# Patient Record
Sex: MALE | Race: WHITE | Employment: OTHER | URBAN - METROPOLITAN AREA
[De-identification: names, ages, dates, MRNs, and addresses within clinical notes are randomized per-mention and may not be internally consistent; named-entity substitution may affect disease eponyms.]

---

## 2018-05-03 ENCOUNTER — HOSPITAL ENCOUNTER (OUTPATIENT)
Dept: PHYSICAL THERAPY | Age: 83
Discharge: HOME | End: 2018-05-03
Payer: MEDICARE

## 2018-05-03 PROCEDURE — G8978 MOBILITY CURRENT STATUS: HCPCS

## 2018-05-03 PROCEDURE — G8979 MOBILITY GOAL STATUS: HCPCS

## 2018-05-03 PROCEDURE — 97112 NEUROMUSCULAR REEDUCATION: CPT

## 2018-05-03 PROCEDURE — 97162 PT EVAL MOD COMPLEX 30 MIN: CPT

## 2018-05-03 NOTE — PROGRESS NOTES
[facility] AT [address], [place] [address] [facility], [place] [address] - Phone: [phone]  Fax: [phone] / 1731 [facility]  Patient Name: Claude Bay : 3/10/1930   Medical   Diagnosis: Dizziness and giddiness [R42] Treatment Diagnosis: Dizziness and giddiness [R42]   Onset Date: 2018     Referral Source: ELLI Crane Start of [facility]): 5/3/2018   Prior Hospitalization: See medical history Provider #: [id number]   Prior Level of Function: Independent ambulation without dizziness    Comorbidities: Atrial Fibrillation, Pacemaker, Deep Brain Stimulator (essential tremor), Hx of Cataracts, Hx of Prostate Cancer   Medications: Verified on Patient Summary List   The Plan of Care and following information is based on the information from the initial evaluation.    ===========================================================================================  Assessment / key information:  Patient is 80 y.o. yo male who presents to [facility] with diagnosis of Dizziness and giddiness [R42]. Patient reports rapid onset of dizziness and imbalance began 2 weeks ago with reaching to pay a parking meeter. Notes episode of nausea, vomiting, dizziness, right eye movements, imbalance and difficulty ambulating. Notes 1 fall forward while walking with a walker while attempting to open a door. Upon objective evaluation, patient demonstrates impaired use of vestibular input input and hip/ankle balance strategies. Patient scored 10/24 on DGI indicating increased risk of fall with ambulation. Debo Pérez was cleared and negative. Patient scored 46 on FOTO indcating decreased quality of life.   Patient can benefit from PT interventions to decrease r/o fall, improve safety with ADLs, & decrease sx with ADLs & to improve overall functional status.  ==================================================================================  Eval Complexity: History: HIGH Complexity :3+ comorbidities / personal factors will impact the outcome/ POC Exam:HIGH Complexity : 4+ Standardized tests and measures addressing body structure, function, activity limitation and / or participation in recreation  Presentation: MEDIUM Complexity : Evolving with changing characteristics  Clinical Decision Making:Other outcome measures DGI 10/24  MEDIUMOverall Complexity:MEDIUM  Problem List: decrease strength, impaired gait/ balance, decrease ADL/ functional abilitiies, decrease activity tolerance, decrease flexibility/ joint mobility and decrease transfer abilities  Treatment Plan may include any combination of the following: Therapeutic exercise, Therapeutic activities, Neuromuscular re-education, Physical agent/modality, Gait/balance training, Manual therapy and Patient education  Patient / Family readiness to learn indicated by: asking questions, trying to perform skills and interest  Persons(s) to be included in education: patient (P)  Barriers to Learning/Limitations: yes;  sensory deficits-vision/hearing/speech  Measures taken: Larger Font   Patient Goal (s): \"Stay upright\"   Patient self reported health status: good  Rehabilitation Potential: good   Short Term Goals: To be accomplished in  4  Weeks:  1) Patient performing daily HEP and educated in fall prevention techniques. 2) Patient will be able to maintain MSR heel to bunion for 30 seconds eyes open to increase safety with standing in narrow spaces. 3) Patient will be able to maintain MSR heel to arch for 30 seconds eyes closed to increase safety with showering. 4) Patient to improve score on DGI to 13/24 indicating decreased fall risk with community ambulation.  Long Term Goals:  To be accomplished in  8  Weeks:  1) Patient to be independent with HEP and instructed in vestibular taper to ensure safety and decreased risk of falls following discharge. 2) Patient to improve score on FOTO to 73 indicating improved quality of life. 3) Patient to improve score on DHI to 12/100 indicating decreased dizziness sx with functional mobility and improved QOL. 4) Patient to improve score on DGI to 16/24 indicating decreased fall risk with ambulation. 5) Patient will be able to maintain MSR heel to GT for 30 seconds eyes open to increase safety with standing in narrow spaces. 6) Patient will be able to maintain MSR heel to bunion for 30 seconds eyes closed to increase safety with showering. 7) Patient will be able to maintain Rail stance for 20 seconds eyes closed to increase safety with showering. Frequency / Duration:   Patient to be seen  1-2  times per week for 6-8  weeks:  Patient / Caregiver education and instruction: self care, activity modification and exercises  G-Codes (GP): Mobility [id number] Current  CK= 40-59%   Goal  CJ= 20-39%. The severity rating is based on the Other DGI      Therapist Signature: Gerhardt Buba Date: 2/7/0847   Certification Period: 5/3/2018 to 8/2/2018 Time: 8:13 AM   ===========================================================================================  I certify that the above Physical Therapy Services are being furnished while the patient is under my care. I agree with the treatment plan and certify that this therapy is necessary. Physician Signature:        Date:       Time:     Please sign and return to [facility] or you may fax the signed copy to [phone]. Thank you.

## 2018-05-03 NOTE — PROGRESS NOTES
PHYSICAL THERAPY - DAILY TREATMENT NOTE    Patient Name: Drexel Bamberger        Date: 5/3/2018  : 3/10/1930   YES Patient  Verified  Visit #:   1   of   8  Insurance: Payor: Gricel Art / Plan: [place] MED[place] PART [place] & B / Product Type: Medicare /      In time: 9:34 am Out time: 10:34am   Total Treatment Time: 60     Medicare Time Tracking (below)   Total Timed Codes (min):  12 1:1 Treatment Time:  12     TREATMENT AREA =  Dizziness and giddiness [R42]  SUBJECTIVE  Pain Level (on 0 to 10 scale):  0  / 10   Medication Changes/New allergies or changes in medical history, any new surgeries or procedures? NO    If yes, update Summary List   Subjective Functional Status/Changes:  []  No changes reported     See POC         OBJECTIVE  Modalities Rationale:   N/a    12 min Neuro Re-education: Education in vestibular rehabilitation program, anatomy and physiology, VSE in sitting and purpose of vestibular adaptation exercise   Rationale:         min Patient Education:  YES  Reviewed HEP   []  Progressed/Changed HEP based on: Other Objective/Functional Measures:  Physical Therapy Evaluation - Vestibular    Posture:  [] WNL      [x] Forward head    [x] Protracted shoulders    [] Retracted shoulders  [] Kyphosis:  [x] increased   [] decreased   [] Lordosis:   [] increased   [x] decreased    C/S ROM: [] WFL    [x] Limited    Describe:   C/S flexion 85%, Ext 75%, R/L SB 50%/50%, R/L Rot 25%/25%    Strength: Assess NV    Optional Tests:  Sensation:  [x] IntactDescribe: KYLAH LE light touch sensation WNLs    Coordination Testing:       Disdiadochokinesia [x] WFL    [] Impaired    Describe:       Heel - Shin  [x] [facility]    [] Impaired    Describe: Toe Tap   [x] [facility]    [] Impaired    Describe:    Oculomotor Tests: (Fixation Not Blocked)       Ocular ROM:   [x] WFL    [] Limited    Describe:       Spontaneous Nystag. [x] Neg     [] Pos    [] Left    [] Right       Gaze Holding Nystag.  [x] Neg     [] Pos    [] Left    [] Right        Smooth Pursuit  [x] Neg     [] Pos    [] Left    [] Right        Saccades   [] Neg     [x] Pos    [x] Left    [x] Right        VOR - Slow Head Mvmt [x] Neg     [] Pos    [] Left    [] Right        VOR - Fast Head Mvmt [] Neg     [x] Pos    [] Left    [x] Right        Head Thrust  [x] Neg     [] Pos    [] Left    [] Right     Other Special Tests:       Hallpike-Claudine Maneuver [x] Neg     [] Pos    [x] Left    [x] Right       Dynamic Gait Index [] Neg     [x] Pos    Score: 10/24     Balance Standard Testing (Eyes Open/Eyes Closed - EO/EC)       Romberg   [x] WFL    [] Pos    Describe: 30/30          Stand on Foam  [x] WFL    [] Pos    Describe: 30/30 wide        Standing on Rail  [] WFL    [x] Pos    Describe: 30/2        Sharpened Romberg [] WFL    [x] Pos    Describe: R2/L3        Single Leg Stand  [] WFL    [x] Pos    Describe: 1/1     Motion Sensitivity:  [x] Neg     [] Pos    Describe:     Other Tests:Justification for Eval Complexity:   Patient History: HIGH Complexity :3+ comorbidities / personal factors will impact the outcome/ POC  (Atrial Fibrillation, Pacemaker, Deep Brain Stimulator (essential tremor), Hx of Cataracts, Hx of Prostate Cancer)  Examination:HIGH Complexity : 4+ Standardized tests and measures addressing body structure, function, activity limitation and / or participation in recreation  (See POC and musculoskeletal examination attached,(-) Manus Baba)  Clinical Presentation: MEDIUM Complexity : Evolving with changing characteristics  (Hx of Falls, intermittent dizziness)  Clinical Decision Making:MEDIUM Complexity : FOTO score of 26-74 (Foto 51/100) (DHI 22/100)  Overall Complexity:MEDIUM     Post Treatment Pain Level (on 0 to 10) scale:   0  / 10     ASSESSMENT  Assessment/Changes in Function:     See POC   []  See Progress Note/Recertification   Patient will continue to benefit from skilled PT services to modify and progress therapeutic interventions, address functional mobility deficits, address ROM deficits, address strength deficits, analyze and address soft tissue restrictions, analyze and cue movement patterns, analyze and modify body mechanics/ergonomics and assess and modify postural abnormalities to attain remaining goals.    Progress toward goals / Updated goals:    See POC     PLAN  [x]  Upgrade activities as tolerated YES Continue plan of care   []  Discharge due to :    []  Other:      Therapist: Trinity Mckeon DPT     Date: 5/3/2018 Time: 8:12 AM     Future Appointments  Date Time Provider Ximena Dalal   5/10/2018 1:30 PM [facility] PT [facility] [address] [place] [address]   5/15/2018 3:30 PM Amilcar Mcgovern, PT [facility]   5/18/2018 11:00 AM [place] [address] [place] [address]   5/24/2018 9:30 AM [facility]   5/30/2018 11:30 AM [facility]   6/6/2018 11:00 AM [facility]   6/13/2018 10:00 AM [facility]

## 2018-05-10 ENCOUNTER — HOSPITAL ENCOUNTER (OUTPATIENT)
Dept: PHYSICAL THERAPY | Age: 83
Discharge: HOME | End: 2018-05-10
Payer: MEDICARE

## 2018-05-10 PROCEDURE — 97112 NEUROMUSCULAR REEDUCATION: CPT

## 2018-05-10 PROCEDURE — 97110 THERAPEUTIC EXERCISES: CPT

## 2018-05-10 NOTE — PROGRESS NOTES
PT DAILY TREATMENT NOTE     Patient Name: Victoria Lindquist  Date:5/10/2018  : 3/10/1930  [x]  Patient  Verified  Payor: [place] MEDICARE / Plan: [place] MEDICARE PART A & B / Product Type: Medicare /    In time:130  Out time:220  Total Treatment Time (min): 50  Total Timed Codes (min): 50  1:1 Treatment Time MC (min): 50   Visit #: 2 of 8-12    Treatment Area: Dizziness and giddiness [R42]    SUBJECTIVE  Pain Level (0-10 scale): 0/10  Any medication changes, allergies to medications, adverse drug reactions, diagnosis change, or new procedure performed?: [x] No    [] Yes (see summary sheet for update)  Subjective functional status/changes:   [] No changes reported  Pt without new c/o LOB and said he is doing his HEP daily as instructed. OBJECTIVE  Modality rationale:  The below therapeutic interventions were performed and/or provided to:    Min Type Additional Details    [] Estim: []Att   []Unatt        []TENS instruct                  []IFC  []Premod   []NMES                     []Other:  []w/US   []w/ice   []w/heat  Position:  Location:    []  Traction: [] Cervical       []Lumbar                       [] Prone          []Supine                       []Intermittent   []Continuous Lbs:  [] before manual  [] after manual    []  Ultrasound: []Continuous   [] Pulsed                           []1MHz   []3MHz Location:  W/cm2:    []  Iontophoresis with dexamethasone         Location: [] Take home patch   [] In clinic    []  Ice     []  heat  []  Ice massage Position:  Location:    []  Vasopneumatic Device Pressure:       [] lo [] med [] hi   Temperature: [] lo [] med [] hi   [] Skin assessment post-treatment:  []intact []redness- no adverse reaction       []redness  adverse reaction:       15 min Therapeutic Exercise:  [x] See flow sheet :   Rationale: increase strength     min Therapeutic Activity:  []  See flow sheet :   Rationale:      35 min Neuromuscular Re-education:  [x]  See flow sheet : ankle/hip strategies using carpet, foam, rail in Romberg, semitandem, and SLS with EO/EC with and without UE assist; steeping strategies with clock game   Rationale: improve balance and increase proprioception        min Manual Therapy:     Rationale:       min Gait Training:  ___ feet with ___ device on level surfaces with ___ level of assist   Rationale:           min Patient Education: [x] Review HEP    [] Progressed/Changed HEP based on:   [] positioning   [] body mechanics   [] transfers   [] heat/ice application          Other Objective/Functional Measures:   Pt with LOB on several occasions with hip strategies-LOB to the right with CGA to correct  Pt with LOB backwards with ankle strategies    Pain Level (0-10 scale) post treatment: 0/10    ASSESSMENT/Changes in Function:   Pt required CGA throughout program and with dynamic standing exercises and balance therex secondary to decreased weightshifting onto RLE. Manual approximation through pelvis to increase weightshifting through pelvis. Patient will continue to benefit from skilled PT services to modify and progress therapeutic interventions to attain remaining goals. [x]  See Plan of Care  []  See progress note/recertification  []  See Discharge Summary         Progress towards goals / Updated goals: · Short Term Goals: To be accomplished in  4  Weeks:  1) Patient performing daily HEP and educated in fall prevention techniques. -INITIATED 5/10  2) Patient will be able to maintain MSR heel to bunion for 30 seconds eyes open to increase safety with standing in narrow spaces. 3) Patient will be able to maintain MSR heel to arch for 30 seconds eyes closed to increase safety with showering. 4) Patient to improve score on DGI to 13/24 indicating decreased fall risk with community ambulation.       · Long Term Goals:  To be accomplished in  8  Weeks:  1) Patient to be independent with HEP and instructed in vestibular taper to ensure safety and decreased risk of falls following discharge. 2) Patient to improve score on FOTO to 73 indicating improved quality of life. 3) Patient to improve score on DHI to 12/100 indicating decreased dizziness sx with functional mobility and improved QOL. 4) Patient to improve score on DGI to 16/24 indicating decreased fall risk with ambulation. 5) Patient will be able to maintain MSR heel to GT for 30 seconds eyes open to increase safety with standing in narrow spaces. 6) Patient will be able to maintain MSR heel to bunion for 30 seconds eyes closed to increase safety with showering.     7) Patient will be able to maintain Rail stance for 20 seconds eyes closed to increase safety with showering.           PLAN  []  Upgrade activities as tolerated     [x]  Continue plan of care  []  Update interventions per flow sheet       []  Discharge due to:_  []  Other:_      Karolina Escalante, PT 5/10/2018  2:24 PM

## 2018-05-15 ENCOUNTER — HOSPITAL ENCOUNTER (OUTPATIENT)
Dept: PHYSICAL THERAPY | Age: 83
Discharge: HOME | End: 2018-05-15
Payer: MEDICARE

## 2018-05-15 PROCEDURE — 97112 NEUROMUSCULAR REEDUCATION: CPT

## 2018-05-15 NOTE — PROGRESS NOTES
PHYSICAL THERAPY - DAILY TREATMENT NOTE    Patient Name: Ghassan Delgado        Date: 5/15/2018  : 3/10/1930   YES Patient  Verified  Visit #:   3     Insurance: Payor: Sha Marks / Plan: [place] MEDICARE PART A & B / Product Type: Medicare /      In time: 3:15 Out time: 3:40   Total Treatment Time: 25     Medicare Time Tracking (below)   Total Timed Codes (min):  25 1:1 Treatment Time:  25     TREATMENT AREA =  Dizziness and giddiness [R42]  SUBJECTIVE    Pain Level (on 0 to 10 scale):  0  / 10   Medication Changes/New allergies or changes in medical history, any new surgeries or procedures? NO    If yes, update Summary List   Subjective Functional Status/Changes:  []  No changes reported     Pt without complaints. OBJECTIVE    25 min Neuromuscular Re-ed: SOT   Rationale: assess balance to improve the patient's ability to perform ADLs/IADLs, functional mobility and gait safely and independently without increased pain/symptoms     During NM min Patient Education:  YES  Reviewed HEP   []  Progressed/Changed HEP based on:   Reviewed SOT results     Other Objective/Functional Measures:    SOT - composite = 73 (WNL); fall on condition 5, abnormal VEST score     Post Treatment Pain Level (on 0 to 10) scale:   0  / 10     ASSESSMENT    Assessment/Changes in Function:     Fall on condition 5 and abnormal VEST score suggest impaired ability to effectively use input cues from vestibular system to maintain balance     []  See Progress Note/Recertification   Patient will continue to benefit from skilled PT services to modify and progress therapeutic interventions, address functional mobility deficits, analyze and cue movement patterns, address imbalance/dizziness and instruct in home and community integration to attain remaining goals. Progress toward goals / Updated goals:    Progressing toward goals: · Short Term Goals:  To be accomplished in  4  Weeks:  1) Patient performing daily HEP and educated in fall prevention techniques. -INITIATED 5/10  2) Patient will be able to maintain MSR heel to bunion for 30 seconds eyes open to increase safety with standing in narrow spaces.      3) Patient will be able to maintain MSR heel to arch for 30 seconds eyes closed to increase safety with showering.     4) Patient to improve score on DGI to 13/24 indicating decreased fall risk with community ambulation.         PLAN    [x]  Upgrade activities as tolerated YES Continue plan of care   []  Discharge due to :    []  Other:      Therapist: Lisha Comer PT    Date: 5/15/2018 Time: 3:44 PM     Future Appointments  Date Time Provider Ximena Dalal   5/18/2018 11:00 AM [facility] PT [facility]   5/24/2018 9:30 AM [address]   5/30/2018 11:30 AM [address]   6/6/2018 11:00 AM [address]   6/13/2018 10:00 AM [address]

## 2018-05-18 ENCOUNTER — HOSPITAL ENCOUNTER (OUTPATIENT)
Dept: PHYSICAL THERAPY | Age: 83
Discharge: HOME | End: 2018-05-18
Payer: MEDICARE

## 2018-05-18 PROCEDURE — 97112 NEUROMUSCULAR REEDUCATION: CPT | Performed by: PHYSICAL THERAPIST

## 2018-05-18 NOTE — PROGRESS NOTES
PT DAILY TREATMENT NOTE - [facility]     Patient Name: Bc Peguero  Date:2018  : 3/10/1930  [x]  Patient  Verified  Payor: VA MEDICARE / Plan: VA MEDICARE PART A & B / Product Type: Medicare /    In time:11:05  Out time:11:45  Total Treatment Time (min): 40  Total Timed Codes (min): 40  1:1 Treatment Time ( [address] only): 40   Visit #: 4 of     Treatment Area: Dizziness and giddiness [R42]    SUBJECTIVE  Pain Level (0-10 scale): 0/10 - no dizziness  Any medication changes, allergies to medications, adverse drug reactions, diagnosis change, or new procedure performed?: [x] No    [] Yes (see summary sheet for update)  Subjective functional status/changes:   [] No changes reported  Doing well - had balance test done on Tuesday - results in record. Has had no falls and wonders why he is here. \"I can do everything I need to\". Am becoming more aware of fall risk. Does [place] with wife 2 days/week. OBJECTIVE     40 min Neuromuscular Re-education:  []  See flow sheet :   Rationale: increase strength, improve coordination, improve balance and increase proprioception  to improve the patients ability to walk safely under all conditions with less fall risk            With   [] TE   [] TA   [] neuro   [] other: Patient Education: [x] Review HEP    [] Progressed/Changed HEP based on:   [] positioning   [] body mechanics   [] transfers   [] heat/ice application    [] other:      Other Objective/Functional Measures:     Educated regarding fall risk; using night lights - ankle strategies, etc.  Showed weakness in hips and standing on foot during exercise so he better understands the need for PT. More agreeable at session end. Worked on lots of lateral and backward walking with hip abd/ext strengthening    Pain Level (0-10 scale) post treatment: 0    ASSESSMENT/Changes in Function: Improved stability with lateral and backward walking at session end.   Improved understanding of the need for PT to prevent falls in future. Patient will continue to benefit from skilled PT services to modify and progress therapeutic interventions, address functional mobility deficits, address ROM deficits, address strength deficits, analyze and modify body mechanics/ergonomics, assess and modify postural abnormalities, address imbalance/dizziness and instruct in home and community integration to attain remaining goals. []  See Plan of Care  []  See progress note/recertification  []  See Discharge Summary         Progress towards goals / Updated goals:  Short Term Goals: To be accomplished in  4  Weeks:  1) Patient performing daily HEP and educated in fall prevention techniques. -INITIATED 5/10  2) Patient will be able to maintain MSR heel to bunion for 30 seconds eyes open to increase safety with standing in narrow spaces.    PROGRESSING  3) Patient will be able to maintain MSR heel to arch for 30 seconds eyes closed to increase safety with showering.  PROGRESSING    4) Patient to improve score on DGI to 13/24 indicating decreased fall risk with community ambulation.      PLAN  [x]  Upgrade activities as tolerated     [x]  Continue plan of care  []  Update interventions per flow sheet       []  Discharge due to:_  []  Other:_      Rachel Gonzales PT 5/18/2018  11:04 AM    Future Appointments  Date Time Provider Ximena Dalal   5/24/2018 9:30 AM [address]   5/30/2018 11:30 AM [address]   6/6/2018 11:00 AM [facility]   6/13/2018 10:00 AM [facility]

## 2018-05-24 ENCOUNTER — HOSPITAL ENCOUNTER (OUTPATIENT)
Dept: PHYSICAL THERAPY | Age: 83
Discharge: HOME | End: 2018-05-24
Payer: MEDICARE

## 2018-05-24 PROCEDURE — 97116 GAIT TRAINING THERAPY: CPT

## 2018-05-24 PROCEDURE — 97112 NEUROMUSCULAR REEDUCATION: CPT

## 2018-05-24 NOTE — PROGRESS NOTES
PHYSICAL THERAPY - DAILY TREATMENT NOTE    Patient Name: Jermaine Kennedy        Date: 2018  : 3/10/1930   YES Patient  Verified  Visit #:   6     Insurance: Payor: Darshan Herrera / Plan: [place] MEDICARE PART [place] & B / Product Type: Medicare /      In time: 9:35 Out time: 10:05   Total Treatment Time: 30     Medicare Time Tracking (below)   Total Timed Codes (min):  30 1:1 Treatment Time:  30     TREATMENT AREA =  Dizziness and giddiness [R42]    SUBJECTIVE  Pain Level (on 0 to 10 scale):  0  / 10   Medication Changes/New allergies or changes in medical history, any new surgeries or procedures? NO    If yes, update Summary List   Subjective Functional Status/Changes:  []  No changes reported     Patient States \"im doing helga chi with my wife\"         OBJECTIVE  Modalities Rationale:  n/a    22 min Neuromuscular Re-ed: Static and dynamic balance as per flow sheet   Rationale:    improve balance to improve the patients ability to ambulate on uneven terrain and in narrow corridors. 8 min Gait Training: See flow Sheet   Rationale:         min Patient Education:  YES  Reviewed HEP   []  Progressed/Changed HEP based on: Other Objective/Functional Measures: Added VVI with good tolerance. Post Treatment Pain Level (on 0 to 10) scale:   0  / 10     ASSESSMENT  Assessment/Changes in Function:     Progressed VSE to standing and added VVI in sitting. VCing for proper performance of VVI for max benefit. Progressed march to on foam with increased challenge. Increased wobbling, instability and leg crossing with tandem walk.       []  See Progress Note/Recertification   Patient will continue to benefit from skilled PT services to modify and progress therapeutic interventions, address functional mobility deficits, address ROM deficits, address strength deficits, analyze and address soft tissue restrictions, analyze and cue movement patterns, analyze and modify body mechanics/ergonomics, assess and modify postural abnormalities, address imbalance/dizziness and instruct in home and community integration to attain remaining goals.    Progress toward goals / Updated goals:    Progressing towards STG 1.   1. Est HEP     PLAN  [x]  Upgrade activities as tolerated YES Continue plan of care   []  Discharge due to :    []  Other:      Therapist: Elena Shell    Date: 5/24/2018 Time: 6:59 AM     Future Appointments  Date Time Provider Ximena Dalal   5/24/2018 9:30 AM [address]   5/30/2018 11:30 AM [address]   6/6/2018 11:00 AM Cloyce Polish Georgina Hockey [facility]   6/13/2018 10:00 AM Cloyc Polish Georgina Hockey [facility]

## 2018-05-30 ENCOUNTER — HOSPITAL ENCOUNTER (OUTPATIENT)
Dept: PHYSICAL THERAPY | Age: 83
Discharge: HOME | End: 2018-05-30
Payer: MEDICARE

## 2018-05-30 PROCEDURE — 97116 GAIT TRAINING THERAPY: CPT

## 2018-05-30 PROCEDURE — 97112 NEUROMUSCULAR REEDUCATION: CPT

## 2018-05-30 NOTE — PROGRESS NOTES
PHYSICAL THERAPY - DAILY TREATMENT NOTE    Patient Name: Raquel Jones        Date: 2018  : 3/10/1930   YES Patient  Verified  Visit #:     Insurance: Payor: Shashi Hu / Plan: [place] MEDICARE PART A & B / Product Type: Medicare /      In time: 11:33am Out time: 12:08 am   Total Treatment Time: 35     Medicare Time Tracking (below)   Total Timed Codes (min):  35 1:1 Treatment Time:  35     TREATMENT AREA =  Dizziness and giddiness [R42]    SUBJECTIVE  Pain Level (on 0 to 10 scale):  0  / 10   Medication Changes/New allergies or changes in medical history, any new surgeries or procedures? NO    If yes, update Summary List   Subjective Functional Status/Changes:  []  No changes reported     Patient States \"its been alright balance is about the same\"         OBJECTIVE  Modalities Rationale:  n/a    27 min Neuromuscular Re-ed: Static and dynamic balance as per flow sheet   Rationale:    improve balance to improve the patients ability to ambulate on uneven terrain and in narrow corridors. 8 min Gait Training: See flow Sheet   Rationale:         min Patient Education:  YES  Reviewed HEP   []  Progressed/Changed HEP based on: Other Objective/Functional Measures: Added lateral hurdles with good tolerance. Post Treatment Pain Level (on 0 to 10) scale:   0  / 10     ASSESSMENT  Assessment/Changes in Function:     Continued to required VCing for VSE for proper performance of vestibular adaptation exercise. Progressed EO heel to bunion to heel to GT with good tolerance. Significant impairment with EC rail stance. Progressed balance training and updated and issued HEP.        []  See Progress Note/Recertification   Patient will continue to benefit from skilled PT services to modify and progress therapeutic interventions, address functional mobility deficits, address ROM deficits, address strength deficits, analyze and address soft tissue restrictions, analyze and cue movement patterns, analyze and modify body mechanics/ergonomics, assess and modify postural abnormalities, address imbalance/dizziness and instruct in home and community integration to attain remaining goals. Progress toward goals / Updated goals:    1) Patient performing daily HEP and educated in fall prevention techniques. Goal in progress - Updated and issued HEP  2) Patient will be able to maintain MSR heel to bunion for 30 seconds eyes open to increase safety with standing in narrow spaces. Goal Met MSR Heel to Bunion EO = 30 sec  3) Patient will be able to maintain MSR heel to arch for 30 seconds eyes closed to increase safety with showering. Goal Met Heel to Arch EC = 30 sec    4) Patient to improve score on DGI to 13/24 indicating decreased fall risk with community ambulation.  Goal in progress     PLAN  [x]  Upgrade activities as tolerated YES Continue plan of care   []  Discharge due to :    []  Other:      Therapist: Vincent Baeza    Date: 5/30/2018 Time: 8:41 AM     Future Appointments  Date Time Provider Ximena Dalal   5/30/2018 11:30 AM Young [address] [place] [address]   6/6/2018 11:00 AM Lidia Sexton [facility]   6/13/2018 10:00 AM Vincent Baeza [facility]

## 2018-06-06 ENCOUNTER — HOSPITAL ENCOUNTER (OUTPATIENT)
Dept: PHYSICAL THERAPY | Age: 83
Discharge: HOME | End: 2018-06-06
Payer: MEDICARE

## 2018-06-06 PROCEDURE — 97112 NEUROMUSCULAR REEDUCATION: CPT

## 2018-06-06 PROCEDURE — G8980 MOBILITY D/C STATUS: HCPCS

## 2018-06-06 PROCEDURE — G8979 MOBILITY GOAL STATUS: HCPCS

## 2018-06-06 PROCEDURE — 97116 GAIT TRAINING THERAPY: CPT

## 2018-06-06 NOTE — PROGRESS NOTES
PHYSICAL THERAPY - DAILY TREATMENT NOTE    Patient Name: Lyndsey Kirkland        Date: 2018  : 3/10/1930   YES Patient  Verified  Visit #:     Insurance: Payor: Lane Pore / Plan: [place] MEDICARE PART A & B / Product Type: Medicare /      In time: 11:05 am Out time: 11:31am   Total Treatment Time: 26     Medicare Time Tracking (below)   Total Timed Codes (min):  26 1:1 Treatment Time:  26     TREATMENT AREA =  Dizziness and giddiness [R42]  SUBJECTIVE  Pain Level (on 0 to 10 scale):  0  / 10   Medication Changes/New allergies or changes in medical history, any new surgeries or procedures? NO    If yes, update Summary List   Subjective Functional Status/Changes:  []  No changes reported     Pt states \"Its getting better, but it get tiered quicker than I used to\"         OBJECTIVE  Modalities Rationale:   N/a    18 min Neuro Re-education: See flow sheet   Rationale:       26 min Gait Training: See flow sheet, DGI Re-assessment    Rationale:    Improve gait mechanics to walk independently and safely in the grocery store       min Patient Education:  YES  Reviewed HEP   []  Progressed/Changed HEP based on: Other Objective/Functional Measures:     See PN   Post Treatment Pain Level (on 0 to 10) scale:   0  / 10     ASSESSMENT  Assessment/Changes in Function:     See PN   []  See Progress Note/Recertification   Patient will continue to benefit from skilled PT services to modify and progress therapeutic interventions, address functional mobility deficits, address ROM deficits, address strength deficits, analyze and address soft tissue restrictions, analyze and cue movement patterns, analyze and modify body mechanics/ergonomics and assess and modify postural abnormalities to attain remaining goals.    Progress toward goals / Updated goals:    See PN     PLAN  [x]  Upgrade activities as tolerated YES Continue plan of care   []  Discharge due to :    []  Other:      Therapist: Nahomy Reynoso DPT Date: 6/6/2018 Time: 7:48 AM     Future Appointments  Date Time Provider Ximena Dalal   6/6/2018 11:00 AM Young [address] [place] [address]   6/13/2018 10:00 [facility]

## 2018-06-06 NOTE — PROGRESS NOTES
[address] PHYSICAL THERAPY  [address] -   Phone: [phone]  Fax: [phone]  [x]  PROGRESS NOTE  []   [facility] SUMMARY  Patient Name: Ghassan Delgado : 3/10/1930   Treatment Diagnosis: Dizziness and giddiness [R42]     Referral Source: Joaquin Augustineman [place]     Date of Initial Visit: 5/3/2018 Attended Visits: 7 Missed Visits: 0     SUMMARY OF TREATMENT  Therapeutic exercises including strengthening, vestibular adaptation card exercises, balance training, gait training, fall prevention strategies, and HEP instruction. CURRENT STATUS  The pt has progressed slowly but well with therapy, consistently reporting decreased dizziness and improved stability with functional mobility. Currently, the patient's main complaint is decreased balance with helga chi. Patient continues to demonstrate impaired use of vestibular input and is only able to achieve only 2 sec EC on Rail. Patient scored 19/ on DGI indicating decreased risk of falls with ambulation. Pt would benefit from continued PT services in order to decrease dizziness, improve static/dynamic balance, gait training, and address remaining impairments. Goal/Measure of Progress Goal Met? 1.  Establish HEP to prevent further disability. Status at last Eval: Established Current Status: I with HEP yes   2. Patient will be able to maintain MSR heel to bunion for 30 seconds eyes open to increase safety with standing in narrow spaces. Status at last Eval: Goal Established  Current Status: MSR heel to GT = 30 yes   3. Patient will be able to maintain MSR heel to arch for 30 seconds eyes closed to increase safety with showering. Status at last Eval: Goal Established  Current Status: MSR heel to bunion = 30 sec yes   4.    Patient to improve score on DGI to  indicating decreased fall risk with community ambulation   Status at last Eval: DGI = 10/24 Current Status: DGI =  yes     New Goals to be achieved in __3-4__  Weeks:  1. Patient to be independent & compliant with progressive HEP and vestibular taper in preparation for D/C.   2.  Patient to improve score on FOTO to 73 indicating improved quality of life   3. Patient to improve score on DHI to 12/100 indicating decreased dizziness sx with functional mobility and improved QOL. 4.  Patient to improve score on DGI to 21/24 indicating decreased fall risk with ambulation. 5.  Patient will be able to maintain SR for 30 seconds eyes open to increase safety with standing in narrow spaces. 6.  Patient will be able to maintain MSR heel to GT for 30 seconds eyes closed to increase safety with showering. 7.  Patient will be able to maintain Rail stance for 10 seconds eyes closed to increase safety with showering. G-Codes (GP): Mobility  [id number] Goal  CJ= 20-39%  D/C  CJ= 20-39%. The severity rating is based on the Other DGI    RECOMMENDATIONS  Continue therapy with the following recommendations: 1-2x per week for 3 weeks    If you have any questions/comments please contact us directly at [phone]   Thank you for allowing us to assist in the care of your patient. Therapist Signature: Nahomy Reynoso DPT Date: 6/6/2018     Time: 7:43 AM   NOTE TO PHYSICIAN:  PLEASE COMPLETE THE ORDERS BELOW AND FAX TO   [facility] Physical Therapy: [phone]  If you are unable to process this request in 24 hours please contact our office: [phone]    ___ I have read the above report and request that my patient continue as recommended.   ___ I have read the above report and request that my patient continue therapy with the following changes/special instructions:_________________________________________________________   ___ I have read the above report and request that my patient be discharged from therapy.      Physician Signature:        Date:       Time:

## 2018-06-13 ENCOUNTER — HOSPITAL ENCOUNTER (OUTPATIENT)
Dept: PHYSICAL THERAPY | Age: 83
Discharge: HOME | End: 2018-06-13
Payer: MEDICARE

## 2018-06-13 PROCEDURE — G8978 MOBILITY CURRENT STATUS: HCPCS

## 2018-06-13 PROCEDURE — 97110 THERAPEUTIC EXERCISES: CPT

## 2018-06-13 PROCEDURE — 97112 NEUROMUSCULAR REEDUCATION: CPT

## 2018-06-13 PROCEDURE — G8979 MOBILITY GOAL STATUS: HCPCS

## 2018-06-13 PROCEDURE — 97116 GAIT TRAINING THERAPY: CPT

## 2018-06-13 NOTE — PROGRESS NOTES
PHYSICAL THERAPY - DAILY TREATMENT NOTE    Patient Name: Reba Clock        Date: 2018  : 3/10/1930   YES Patient  Verified  Visit #:   8     Insurance: Payor: Mckenna Hart / Plan: [place] MEDICARE PART A & B / Product Type: Medicare /      In time: 10:06 am Out time: 10:37am   Total Treatment Time: 31     Medicare Time Tracking (below)   Total Timed Codes (min):  31 1:1 Treatment Time:  31     TREATMENT AREA =  Dizziness and giddiness [R42]  SUBJECTIVE  Pain Level (on 0 to 10 scale):  0  / 10   Medication Changes/New allergies or changes in medical history, any new surgeries or procedures? NO    If yes, update Summary List   Subjective Functional Status/Changes:  []  No changes reported     Pt states \"everything is challenging these days, last few days ervin been more shakey and off balance than usual\"         OBJECTIVE  Modalities Rationale:   N/a    23 min Neuro Re-education: See flow sheet   Rationale:       8 min Gait Training: See flow sheet   Rationale:    Improve gait mechanics to walk independently and safely in the grocery store       min Patient Education:  YES  Reviewed HEP   []  Progressed/Changed HEP based on: Other Objective/Functional Measures: Added ambulation with EC   Post Treatment Pain Level (on 0 to 10) scale:   0  / 10     ASSESSMENT  Assessment/Changes in Function:     Progressed VSE/VVI to standing on foam narrow stance. Increased difficulty with heel to GT EC, thus, resumed heel to bunion EO. Progressed heel to GT EO to heel to toe EO.  Updated and issued HEP   []  See Progress Note/Recertification   Patient will continue to benefit from skilled PT services to modify and progress therapeutic interventions, address functional mobility deficits, address ROM deficits, address strength deficits, analyze and address soft tissue restrictions, analyze and cue movement patterns, analyze and modify body mechanics/ergonomics and assess and modify postural abnormalities to attain remaining goals. Progress toward goals / Updated goals:    Progressing towards newly est LTGs.       PLAN  [x]  Upgrade activities as tolerated YES Continue plan of care   []  Discharge due to :    []  Other:      Therapist: Michael Figueroa DPT     Date: 6/13/2018 Time: 9:26 AM     Future Appointments  Date Time Provider Ximena Dalal   6/13/2018 10:00 AM Jose Matthew [facility]   6/18/2018 9:00 AM Juliann Milan PT [facility]   6/20/2018 9:00 [facility]

## 2018-06-18 ENCOUNTER — HOSPITAL ENCOUNTER (OUTPATIENT)
Dept: PHYSICAL THERAPY | Age: 83
Discharge: HOME | End: 2018-06-18
Payer: MEDICARE

## 2018-06-18 PROCEDURE — 97112 NEUROMUSCULAR REEDUCATION: CPT

## 2018-06-18 NOTE — PROGRESS NOTES
PHYSICAL THERAPY - DAILY TREATMENT NOTE    Patient Name: Luis Carlos Antoine        Date: 2018  : 3/10/1930   YES Patient  Verified  Visit #:   10     Insurance: Payor: Cayla  / Plan: VA MEDICARE PART A & B / Product Type: Medicare /      In time: 9:02 Out time: 9:30   Total Treatment Time: 28     Medicare/BCBS [place] Time Tracking (below)   Total Timed Codes (min):  28 1:1 Treatment Time:  28     TREATMENT AREA =  Dizziness and giddiness [R42]    SUBJECTIVE    Pain Level (on 0 to 10 scale):  0  / 10   Medication Changes/New allergies or changes in medical history, any new surgeries or procedures? NO    If yes, update Summary List   Subjective Functional Status/Changes:  []  No changes reported     Patient reports compliance with HEP. OBJECTIVE    28 min Neuromuscular Re-ed:    Rationale:      Balance reassessment       min Patient Education:  YES  Reviewed HEP   []  Progressed/Changed HEP based on:   Cont HEP     Other Objective/Functional Measures:    Sensory Organization Test (SOT): composite score = 78 (WNL) with evidence of normal use of somatosensory, visual, and vestibular input. Post Treatment Pain Level (on 0 to 10) scale:   0  / 10     ASSESSMENT    Assessment/Changes in Function:     SOT results WNL     []  See Progress Note/Recertification   Patient will continue to benefit from skilled PT services to modify and progress therapeutic interventions, address functional mobility deficits, analyze and modify body mechanics/ergonomics, assess and modify postural abnormalities, address imbalance/dizziness and instruct in home and community integration to attain remaining goals.      Progress toward goals / Updated goals:    SOT composite score = 78     PLAN    [x]  Upgrade activities as tolerated YES Continue plan of care   []  Discharge due to :    []  Other:      Therapist: Ladan Aleman PT    Date: 2018 Time: 9:30 AM     Future Appointments  Date Time Provider Ximena Dalal   6/20/2018 9:00 AM [place] [address]

## 2018-06-27 ENCOUNTER — HOSPITAL ENCOUNTER (OUTPATIENT)
Dept: PHYSICAL THERAPY | Age: 83
Discharge: HOME | End: 2018-06-27
Payer: MEDICARE

## 2018-06-27 PROCEDURE — G8980 MOBILITY D/C STATUS: HCPCS

## 2018-06-27 PROCEDURE — G8979 MOBILITY GOAL STATUS: HCPCS

## 2018-06-27 PROCEDURE — 97116 GAIT TRAINING THERAPY: CPT

## 2018-06-27 PROCEDURE — 97112 NEUROMUSCULAR REEDUCATION: CPT

## 2018-06-27 NOTE — PROGRESS NOTES
[facility] PHYSICAL THERAPY  [address] - Phone: [phone]  Fax: [phone] SUMMARY FOR PHYSICAL THERAPY          Patient Name: Ginette Luis : 3/10/1930   Treatment/Medical Diagnosis: Dizziness and giddiness [R42]   Onset Date: 2018    [facility]   Prior Hospitalization: See Medical History Provider #: [id number]   Prior Level of Function: Independent ambulation without dizziness    Comorbidities: Atrial Fibrillation, Pacemaker, Deep Brain Stimulator (essential tremor), Hx of Cataracts, Hx of Prostate Cancer   Medications: Verified on Patient Summary List   Vis[facility] from [facility]: 10 Missed Visits: 0     Goal/Measure of Progress Goal Met? 1. Patient to be independent & compliant with progressive HEP and vestibular taper in preparation for D/C. Status at last Eval: Goal established  Current Status: I with HEP yes   2. Patient to improve score on FOTO to 73 indicating improved quality of    Status at last Eval: FOTO = 51 Current Status: FOTO = 83 yes   3. Patient to improve score on DHI to 12/100 indicating decreased dizziness sx with functional mobility and improved QOL. Status at last Eval: [address] [place] [address] = 22/100 Current Status: [address] [place] [address] =  Progressing     4. Patient to improve score on DGI to  indicating decreased fall risk with ambulation.      Status at last Eval: DGI = 10/24 Current Status: DGI =  Progressing   5. Patient will be able to maintain SR for 30 seconds eyes open to increase safety with standing in narrow spaces.    R/L SR EO = 2/3 sec Current Status: R/L SR EO = 30 sec Yes   6. Patient will be able to maintain MSR heel to GT for 30 seconds eyes closed to increase safety with showering. Goal established  Current Status: L/R MSR heel to GT EC = 9/7 sec Progressing    7.  Patient will be able to maintain Rail stance for 10 seconds eyes closed to increase safety with showering. Rail EC = 2 sec Current Status: Rail EC = 6 sec Progressing     Key Functional Changes/Progress: The pt has progressed well with therapy, consistently reporting improved balance and  functional ability  Pt reports 50% improvement in balance sx since initiating PT services. DGI improved from 10/24 at initial eval to 19/24. Based on progress from PT services and pt reported improvement, patient is appropriate for DC to home mgt of sx at this time    G-Codes (GP): Mobility   Goal  CI= 1-19%  D/C  CJ= 20-39%. The severity rating is based on the FOTO Score    Assessments/Recommendations: Discontinue therapy. Progressing towards or have reached established goals. If you have any questions/comments please contact us directly at  [phone]. Thank you for allowing us to assist in the care of your patient.     Therapist Signature: Gregoria Cohen Date: 6/27/2018   Reporting Period: 5/3/2018 to 6/27/2018 Time: 8:34 AM

## 2018-06-27 NOTE — PROGRESS NOTES
PHYSICAL THERAPY - DAILY TREATMENT NOTE    Patient Name: Olvin Negron        Date: 2018  : 3/10/1930   YES Patient  Verified  Visit #:   10   of   8-12  Insurance: Payor: Micheline Apple / Plan: VA MEDICARE PART A & B / Product Type: Medicare /      In time: 12:01 am Out time: 12:30am   Total Treatment Time: 29     Medicare Time Tracking (below)   Total Timed Codes (min):  29 1:1 Treatment Time:  29     TREATMENT AREA =  Dizziness and giddiness [R42]  SUBJECTIVE  Pain Level (on 0 to 10 scale):  0  / 10   Medication Changes/New allergies or changes in medical history, any new surgeries or procedures? NO    If yes, update Summary List   Subjective Functional Status/Changes:  []  No changes reported     Pt states \"its just old age\"         OBJECTIVE  Modalities Rationale:   N/a    20 min Neuro Re-education: Static balance re-assessment and education in HEP and vestibular taper   Rationale:       9 min Gait Training: DGI re-assessment    Rationale:    Improve gait mechanics to walk independently and safely in the grocery store       min Patient Education:  YES  Reviewed HEP   []  Progressed/Changed HEP based on: Other Objective/Functional Measures:     See DC   Post Treatment Pain Level (on 0 to 10) scale:   0  / 10     ASSESSMENT  Assessment/Changes in Function:     See DC   []  See Progress Note/Recertification   Patient will continue to benefit from skilled PT services to modify and progress therapeutic interventions, address functional mobility deficits, address ROM deficits, address strength deficits, analyze and address soft tissue restrictions, analyze and cue movement patterns, analyze and modify body mechanics/ergonomics and assess and modify postural abnormalities to attain remaining goals.    Progress toward goals / Updated goals:    See DC     PLAN  [x]  Upgrade activities as tolerated YES Continue plan of care   []  Discharge due to :    []  Other:      Therapist: Abilio Garg DPT Date: 6/27/2018 Time: 8:29 AM     Future Appointments  Date Time Provider Ximena Dalal   6/27/2018 12:00 PM Dorlene Day [facility]

## 2023-05-19 RX ORDER — OMEPRAZOLE 20 MG/1
20 CAPSULE, DELAYED RELEASE ORAL DAILY
COMMUNITY

## 2023-05-19 RX ORDER — PRIMIDONE 250 MG/1
TABLET ORAL 4 TIMES DAILY
COMMUNITY

## 2023-05-19 RX ORDER — PROPRANOLOL HYDROCHLORIDE 60 MG/1
60 TABLET ORAL 3 TIMES DAILY
COMMUNITY

## 2023-05-19 RX ORDER — SIMVASTATIN 20 MG
TABLET ORAL
COMMUNITY